# Patient Record
Sex: MALE | Race: OTHER | Employment: PART TIME | URBAN - METROPOLITAN AREA
[De-identification: names, ages, dates, MRNs, and addresses within clinical notes are randomized per-mention and may not be internally consistent; named-entity substitution may affect disease eponyms.]

---
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
## 2024-07-08 ENCOUNTER — HOSPITAL ENCOUNTER (EMERGENCY)
Age: 19
Discharge: HOME | End: 2024-07-08

## 2024-07-08 VITALS
HEIGHT: 62 IN | BODY MASS INDEX: 42.33 KG/M2 | WEIGHT: 230 LBS | DIASTOLIC BLOOD PRESSURE: 78 MMHG | OXYGEN SATURATION: 98 % | TEMPERATURE: 98.3 F | RESPIRATION RATE: 16 BRPM | HEART RATE: 86 BPM | SYSTOLIC BLOOD PRESSURE: 140 MMHG

## 2024-07-08 DIAGNOSIS — Z23 TETANUS TOXOID INOCULATION: ICD-10-CM

## 2024-07-08 DIAGNOSIS — S61.216A LACERATION OF RIGHT LITTLE FINGER WITHOUT FOREIGN BODY WITHOUT DAMAGE TO NAIL, INITIAL ENCOUNTER: Primary | ICD-10-CM

## 2024-07-08 PROCEDURE — 90714 TD VACC NO PRESV 7 YRS+ IM: CPT | Performed by: PHYSICIAN ASSISTANT

## 2024-07-08 PROCEDURE — 6360000002 HC RX W HCPCS: Performed by: PHYSICIAN ASSISTANT

## 2024-07-08 PROCEDURE — 99284 EMERGENCY DEPT VISIT MOD MDM: CPT

## 2024-07-08 PROCEDURE — 90471 IMMUNIZATION ADMIN: CPT | Performed by: PHYSICIAN ASSISTANT

## 2024-07-08 RX ADMIN — CLOSTRIDIUM TETANI TOXOID ANTIGEN (FORMALDEHYDE INACTIVATED) AND CORYNEBACTERIUM DIPHTHERIAE TOXOID ANTIGEN (FORMALDEHYDE INACTIVATED) 0.5 ML: 5; 2 INJECTION, SUSPENSION INTRAMUSCULAR at 15:38

## 2024-07-08 ASSESSMENT — PAIN - FUNCTIONAL ASSESSMENT: PAIN_FUNCTIONAL_ASSESSMENT: NONE - DENIES PAIN

## 2024-07-08 ASSESSMENT — LIFESTYLE VARIABLES
HOW OFTEN DO YOU HAVE A DRINK CONTAINING ALCOHOL: NEVER
HOW MANY STANDARD DRINKS CONTAINING ALCOHOL DO YOU HAVE ON A TYPICAL DAY: PATIENT DOES NOT DRINK

## 2024-07-08 NOTE — ED PROVIDER NOTES
Independent PORFIRIO Visit.             [facility] EMERGENCY DEPARTMENT  ED  Encounter Note  Admit Date/RoomTime: 2024  2:44 PM  ED Room: WAITING RESULTS/WAITING *  NAME: Eduard Hernández  : 2005  MRN: [id number]  PCP: No primary care provider on file.    CHIEF COMPLAINT     Laceration (Right hand 5th digit laceration yesterday)    HISTORY OF PRESENT ILLNESS        Eduard Hernández is a 18 y.o. male who presents to the ED by private vehicle for wound on right fifth finger, beginning 1 day(s) ago. The complaint has been persistent and are mild in severity.  Patient states that he slipped and fell down several steps yesterday.   He reports that he put his right arm up to protect his head and somehow cut the right little finger.  He states that he cleaned it out really well at home and then tried to put some adhesive on the area.  This occurred yesterday around 4 PM. Moving finger with no significant pain.   Denies other significant injuries.  Bruise to right thigh but states it isn't an issue  He is walking well.  Last tetanus uncertain.   Denies hitting head.   No LOC      REVIEW OF SYSTEMS     Pertinent positives and negatives are stated within HPI, all other systems reviewed and are negative.    Past Medical History:  has no past medical history on file.  Surgical History:  has no past surgical history on file.  Social History:    Family History: family history is not on file.   Allergies: Penicillins  CURRENT MEDICATIONS       Previous Medications    IBUPROFEN (ADVIL;MOTRIN) 800 MG TABLET    Take 1 tablet by mouth every 6 hours as needed for Pain       SCREENINGS     [place] Coma Scale  Eye Opening: Spontaneous  Best Verbal Response: Oriented  Best Motor Response: Obeys commands  [place] Coma Scale Score: 15         CIWA Assessment  BP: (!) 140/78  Pulse: 86       PHYSICAL EXAM   Oxygen Saturation Interpretation: Normal on room air analysis.        ED Triage Vitals [24

## 2024-07-08 NOTE — DISCHARGE INSTR - COC
Edgar  is necessary for the continuing treatment of the diagnosis listed and that he requires {Admit to Appropriate Level of Care:52058} for {GREATER/LESS:347047142} 30 days.     Update Admission H&P: {CHP DME Changes in HandP:849530905}    PHYSICIAN SIGNATURE:  {Esignature:[id number]}

## 2024-07-08 NOTE — ED NOTES
Non-adhesive dressing and covered provided to patient's right finger. Home wound care education given at this time.